# Patient Record
Sex: MALE | Race: ASIAN | NOT HISPANIC OR LATINO | Employment: FULL TIME | URBAN - NONMETROPOLITAN AREA
[De-identification: names, ages, dates, MRNs, and addresses within clinical notes are randomized per-mention and may not be internally consistent; named-entity substitution may affect disease eponyms.]

---

## 2019-02-19 ENCOUNTER — WALK IN (OUTPATIENT)
Dept: URGENT CARE | Age: 49
End: 2019-02-19

## 2019-02-19 VITALS
SYSTOLIC BLOOD PRESSURE: 131 MMHG | OXYGEN SATURATION: 98 % | HEART RATE: 102 BPM | TEMPERATURE: 98.4 F | WEIGHT: 180.78 LBS | RESPIRATION RATE: 18 BRPM | DIASTOLIC BLOOD PRESSURE: 91 MMHG

## 2019-02-19 DIAGNOSIS — H61.23 BILATERAL IMPACTED CERUMEN: ICD-10-CM

## 2019-02-19 DIAGNOSIS — J02.9 SORE THROAT: ICD-10-CM

## 2019-02-19 DIAGNOSIS — R52 BODY ACHES: ICD-10-CM

## 2019-02-19 DIAGNOSIS — H65.01 RIGHT ACUTE SEROUS OTITIS MEDIA, RECURRENCE NOT SPECIFIED: Primary | ICD-10-CM

## 2019-02-19 DIAGNOSIS — J06.9 ACUTE URI: ICD-10-CM

## 2019-02-19 DIAGNOSIS — J22 LOWER RESPIRATORY INFECTION: ICD-10-CM

## 2019-02-19 DIAGNOSIS — R68.83 CHILLS: ICD-10-CM

## 2019-02-19 LAB
FLUAV AG SPEC QL IF: NEGATIVE
FLUBV AG SPEC QL IF: NEGATIVE
S PYO AG THROAT QL: NEGATIVE
SPECIMEN SOURCE: NORMAL

## 2019-02-19 PROCEDURE — 87081 CULTURE SCREEN ONLY: CPT | Performed by: INTERNAL MEDICINE

## 2019-02-19 PROCEDURE — 87880 STREP A ASSAY W/OPTIC: CPT | Performed by: FAMILY MEDICINE

## 2019-02-19 PROCEDURE — 87804 INFLUENZA ASSAY W/OPTIC: CPT | Performed by: INTERNAL MEDICINE

## 2019-02-19 PROCEDURE — 99203 OFFICE O/P NEW LOW 30 MIN: CPT | Performed by: FAMILY MEDICINE

## 2019-02-19 RX ORDER — AZITHROMYCIN 250 MG/1
TABLET, FILM COATED ORAL
Qty: 6 TABLET | Refills: 0 | Status: SHIPPED | OUTPATIENT
Start: 2019-02-19

## 2019-02-19 RX ORDER — KETOCONAZOLE 20 MG/G
1 CREAM TOPICAL 2 TIMES DAILY
COMMUNITY
Start: 2019-02-18

## 2019-02-21 ENCOUNTER — TELEPHONE (OUTPATIENT)
Dept: URGENT CARE | Age: 49
End: 2019-02-21

## 2019-02-21 LAB
REPORT STATUS (RPT): NORMAL
S PYO SPEC QL CULT: NORMAL
SPECIMEN SOURCE: NORMAL

## 2023-02-06 ENCOUNTER — OFFICE VISIT (OUTPATIENT)
Dept: FAMILY MEDICINE CLINIC | Facility: CLINIC | Age: 53
End: 2023-02-06
Payer: COMMERCIAL

## 2023-02-06 VITALS
RESPIRATION RATE: 24 BRPM | SYSTOLIC BLOOD PRESSURE: 131 MMHG | DIASTOLIC BLOOD PRESSURE: 86 MMHG | BODY MASS INDEX: 30.04 KG/M2 | OXYGEN SATURATION: 98 % | HEIGHT: 67 IN | HEART RATE: 77 BPM | TEMPERATURE: 99 F | WEIGHT: 191.38 LBS

## 2023-02-06 DIAGNOSIS — J02.9 SORE THROAT: ICD-10-CM

## 2023-02-06 DIAGNOSIS — J02.9 PHARYNGITIS, UNSPECIFIED ETIOLOGY: Primary | ICD-10-CM

## 2023-02-06 LAB
CONTROL LINE PRESENT WITH A CLEAR BACKGROUND (YES/NO): YES YES/NO
KIT EXPIRATION DATE: NORMAL DATE
STREP GRP A CUL-SCR: NEGATIVE

## 2023-02-06 PROCEDURE — 3079F DIAST BP 80-89 MM HG: CPT | Performed by: PHYSICIAN ASSISTANT

## 2023-02-06 PROCEDURE — 3075F SYST BP GE 130 - 139MM HG: CPT | Performed by: PHYSICIAN ASSISTANT

## 2023-02-06 PROCEDURE — 87081 CULTURE SCREEN ONLY: CPT | Performed by: PHYSICIAN ASSISTANT

## 2023-02-06 PROCEDURE — 99202 OFFICE O/P NEW SF 15 MIN: CPT | Performed by: PHYSICIAN ASSISTANT

## 2023-02-06 PROCEDURE — 3008F BODY MASS INDEX DOCD: CPT | Performed by: PHYSICIAN ASSISTANT

## 2023-02-06 PROCEDURE — 87880 STREP A ASSAY W/OPTIC: CPT | Performed by: PHYSICIAN ASSISTANT

## 2023-02-06 RX ORDER — AZITHROMYCIN 250 MG/1
TABLET, FILM COATED ORAL
Qty: 6 TABLET | Refills: 0 | Status: SHIPPED | OUTPATIENT
Start: 2023-02-06 | End: 2023-02-11

## 2024-04-05 ENCOUNTER — WALK IN (OUTPATIENT)
Dept: URGENT CARE | Age: 54
End: 2024-04-05

## 2024-04-05 VITALS
OXYGEN SATURATION: 99 % | RESPIRATION RATE: 18 BRPM | HEART RATE: 112 BPM | TEMPERATURE: 98.8 F | SYSTOLIC BLOOD PRESSURE: 126 MMHG | DIASTOLIC BLOOD PRESSURE: 74 MMHG

## 2024-04-05 DIAGNOSIS — Z20.822 SUSPECTED COVID-19 VIRUS INFECTION: Primary | ICD-10-CM

## 2024-04-05 DIAGNOSIS — U07.1 COVID-19 VIRUS INFECTION: ICD-10-CM

## 2024-04-05 LAB
FLUAV AG UPPER RESP QL IA.RAPID: NEGATIVE
FLUBV AG UPPER RESP QL IA.RAPID: NEGATIVE
SARS-COV+SARS-COV-2 AG RESP QL IA.RAPID: DETECTED
TEST LOT EXPIRATION DATE: ABNORMAL
TEST LOT NUMBER: ABNORMAL

## 2024-04-05 ASSESSMENT — ENCOUNTER SYMPTOMS: COUGH: 1

## 2024-04-06 ENCOUNTER — TELEPHONE (OUTPATIENT)
Dept: URGENT CARE | Age: 54
End: 2024-04-06

## 2024-06-03 ENCOUNTER — IMAGING SERVICES (OUTPATIENT)
Dept: GENERAL RADIOLOGY | Age: 54
End: 2024-06-03
Attending: FAMILY MEDICINE

## 2024-06-03 ENCOUNTER — OFFICE VISIT (OUTPATIENT)
Dept: SPORTS MEDICINE | Age: 54
End: 2024-06-03

## 2024-06-03 VITALS
HEART RATE: 80 BPM | BODY MASS INDEX: 29.82 KG/M2 | WEIGHT: 190 LBS | HEIGHT: 67 IN | DIASTOLIC BLOOD PRESSURE: 85 MMHG | SYSTOLIC BLOOD PRESSURE: 122 MMHG

## 2024-06-03 DIAGNOSIS — E78.5 HYPERLIPIDEMIA LDL GOAL <130: ICD-10-CM

## 2024-06-03 DIAGNOSIS — M79.661 RIGHT CALF PAIN: ICD-10-CM

## 2024-06-03 DIAGNOSIS — S86.111A RUPTURE OF RIGHT GASTROCNEMIUS TENDON, INITIAL ENCOUNTER: ICD-10-CM

## 2024-06-03 DIAGNOSIS — R73.03 PREDIABETES: ICD-10-CM

## 2024-06-03 DIAGNOSIS — M79.661 RIGHT CALF PAIN: Primary | ICD-10-CM

## 2024-06-03 PROBLEM — E53.8 B12 DEFICIENCY: Status: ACTIVE | Noted: 2021-07-19

## 2024-06-03 PROBLEM — E55.9 VITAMIN D DEFICIENCY: Status: ACTIVE | Noted: 2021-07-19

## 2024-06-03 PROBLEM — M10.072 ACUTE IDIOPATHIC GOUT INVOLVING TOE OF LEFT FOOT: Status: ACTIVE | Noted: 2019-04-13

## 2024-06-03 PROCEDURE — 99204 OFFICE O/P NEW MOD 45 MIN: CPT | Performed by: FAMILY MEDICINE

## 2024-06-03 PROCEDURE — 73590 X-RAY EXAM OF LOWER LEG: CPT | Performed by: RADIOLOGY

## 2024-06-03 RX ORDER — TRIAMCINOLONE ACETONIDE 1 MG/G
CREAM TOPICAL
COMMUNITY
Start: 2023-12-19

## 2024-06-03 RX ORDER — ERGOCALCIFEROL 1.25 MG/1
1.25 CAPSULE ORAL
COMMUNITY

## 2024-06-03 RX ORDER — ATORVASTATIN CALCIUM 10 MG/1
10 TABLET, FILM COATED ORAL DAILY
COMMUNITY
Start: 2024-05-03

## 2024-06-03 RX ORDER — LANOLIN ALCOHOL/MO/W.PET/CERES
1000 CREAM (GRAM) TOPICAL
COMMUNITY
Start: 2024-05-03

## 2024-06-03 RX ORDER — MELOXICAM 15 MG/1
15 TABLET ORAL
Qty: 10 TABLET | Refills: 0 | Status: SHIPPED | OUTPATIENT
Start: 2024-06-03 | End: 2024-06-13

## 2024-06-03 RX ORDER — FLUTICASONE PROPIONATE 50 MCG
2 SPRAY, SUSPENSION (ML) NASAL PRN
COMMUNITY
Start: 2024-03-26

## 2024-06-03 RX ORDER — ALLOPURINOL 100 MG/1
100 TABLET ORAL
COMMUNITY
Start: 2024-05-03

## 2024-06-03 RX ORDER — FEXOFENADINE HCL 180 MG/1
180 TABLET ORAL PRN
COMMUNITY
Start: 2024-03-26 | End: 2024-10-22

## 2024-06-04 ENCOUNTER — TELEPHONE (OUTPATIENT)
Dept: SPORTS MEDICINE | Age: 54
End: 2024-06-04

## 2024-06-04 ENCOUNTER — OFFICE VISIT (OUTPATIENT)
Dept: PHYSICAL THERAPY | Age: 54
End: 2024-06-04

## 2024-06-04 DIAGNOSIS — M79.661 RIGHT CALF PAIN: Primary | ICD-10-CM

## 2024-06-04 PROCEDURE — 97161 PT EVAL LOW COMPLEX 20 MIN: CPT | Performed by: PHYSICAL THERAPIST

## 2024-06-04 PROCEDURE — 97112 NEUROMUSCULAR REEDUCATION: CPT | Performed by: PHYSICAL THERAPIST

## 2024-06-04 PROCEDURE — 97110 THERAPEUTIC EXERCISES: CPT | Performed by: PHYSICAL THERAPIST

## 2024-06-04 ASSESSMENT — ENCOUNTER SYMPTOMS
PAIN SCALE AT LOWEST: 0
ALLEVIATING FACTORS: AVOIDING MOVEMENT IN INVOLVED AREA
PAIN LOCATION: SEE NARRATIVE FOR MORE DETAILS
PAIN DESCRIPTION: SEE NARRATIVE FOR MORE DETAILS
SUBJECTIVE PAIN PROGRESSION: IMPROVED
PAIN SEVERITY NOW: 8
ALLEVIATING FACTOR: SEE NARRATIVE FOR MORE DETAILS
QUALITY: STIFF
PAIN SCALE AT HIGHEST: 9

## 2024-06-04 ASSESSMENT — MOVEMENT AND STRENGTH ASSESSMENTS
LIFTING AN OBJECT, LIKE A BAG OF GROCERIES, FROM THE FLOOR: QUITE A BIT OF DIFFICULTY
WALKING A MILE: EXTREME DIFFICULTY OR UNABLE TO PERFORM ACTIVITY
RUNNING ON EVEN GROUND: EXTREME DIFFICULTY OR UNABLE TO PERFORM ACTIVITY
STANDING FOR 1 HOUR: EXTREME DIFFICULTY OR UNABLE TO PERFORM ACTIVITY
PERFORMING LIGHT ACTIVITES AROUND YOUR HOME: QUITE A BIT OF DIFFICULTY
WALKING 2 BLOCKS: EXTREME DIFFICULTY OR UNABLE TO PERFORM ACTIVITY
SQUATTING: QUITE A BIT OF DIFFICULTY
HOPPING: EXTREME DIFFICULTY OR UNABLE TO PERFORM ACTIVITY
PUTTING ON YOUR SHOES OR SOCKS: QUITE A BIT OF DIFFICULTY
YOUR USUAL HOBBIES, RECREATIONAL OR SPORTING ACTIVIITIES: EXTREME DIFFICULTY OR UNABLE TO PERFORM ACTIVITY
WALKING BETWEEN ROOMS: QUITE A BIT OF DIFFICULTY
SITTING FOR 1 HOUR: A LITTLE BIT OF DIFFICULTY
MAKING SHARP TURNS WHILE RUNNING FAST: EXTREME DIFFICULTY OR UNABLE TO PERFORM ACTIVITY
ROLLING OVER IN BED: QUITE A BIT OF DIFFICULTY
ANY OF YOUR USUAL WORK, HOUSEWORK OR SCHOOL ACTIVITIES: EXTREME DIFFICULTY OR UNABLE TO PERFORM ACTIVITY
RUNNING ON UNEVEN GROUND: EXTREME DIFFICULTY OR UNABLE TO PERFORM ACTIVITY
GETTING INTO OR OUT OF A CAR: QUITE A BIT OF DIFFICULTY
GETTING INTO OR OUT OF THE BATH: QUITE A BIT OF DIFFICULTY
PERFORMING HEAVY ACTIVITIES AROUND YOUR HOME: EXTREME DIFFICULTY OR UNABLE TO PERFORM ACTIVITY
TOTAL SCORE: 15
GOING UP OR DOWN 10 STAIRS (ABOUT 1 FLIGHT OF STAIRS): QUITE A BIT OF DIFFICULTY

## 2024-06-06 ENCOUNTER — APPOINTMENT (OUTPATIENT)
Dept: PHYSICAL THERAPY | Age: 54
End: 2024-06-06

## 2024-06-11 ENCOUNTER — MED INFO FORMS (OUTPATIENT)
Dept: SPORTS MEDICINE | Age: 54
End: 2024-06-11

## 2024-06-11 ENCOUNTER — APPOINTMENT (OUTPATIENT)
Dept: PHYSICAL THERAPY | Age: 54
End: 2024-06-11

## 2024-06-11 DIAGNOSIS — M79.661 RIGHT CALF PAIN: Primary | ICD-10-CM

## 2024-06-11 PROCEDURE — 97110 THERAPEUTIC EXERCISES: CPT | Performed by: PHYSICAL THERAPIST

## 2024-06-11 PROCEDURE — 97530 THERAPEUTIC ACTIVITIES: CPT | Performed by: PHYSICAL THERAPIST

## 2024-06-11 PROCEDURE — 97112 NEUROMUSCULAR REEDUCATION: CPT | Performed by: PHYSICAL THERAPIST

## 2024-06-13 ENCOUNTER — APPOINTMENT (OUTPATIENT)
Dept: PHYSICAL THERAPY | Age: 54
End: 2024-06-13

## 2024-06-13 ENCOUNTER — E-ADVICE (OUTPATIENT)
Dept: PHYSICAL THERAPY | Age: 54
End: 2024-06-13

## 2024-06-13 DIAGNOSIS — M79.661 RIGHT CALF PAIN: Primary | ICD-10-CM

## 2024-06-18 ENCOUNTER — APPOINTMENT (OUTPATIENT)
Dept: PHYSICAL THERAPY | Age: 54
End: 2024-06-18

## 2024-06-18 ENCOUNTER — TELEPHONE (OUTPATIENT)
Dept: PHYSICAL THERAPY | Age: 54
End: 2024-06-18

## 2024-08-02 ENCOUNTER — APPOINTMENT (OUTPATIENT)
Dept: SPORTS MEDICINE | Age: 54
End: 2024-08-02

## 2024-10-10 ENCOUNTER — ORDER TRANSCRIPTION (OUTPATIENT)
Dept: ADMINISTRATIVE | Facility: HOSPITAL | Age: 54
End: 2024-10-10

## 2024-10-10 DIAGNOSIS — Z13.6 SCREENING FOR CARDIOVASCULAR CONDITION: Primary | ICD-10-CM

## 2024-10-12 ENCOUNTER — HOSPITAL ENCOUNTER (OUTPATIENT)
Dept: CT IMAGING | Facility: HOSPITAL | Age: 54
Discharge: HOME | End: 2024-10-12
Attending: SPECIALIST

## 2024-10-12 DIAGNOSIS — Z13.6 SCREENING FOR CARDIOVASCULAR CONDITION: ICD-10-CM

## 2024-10-12 LAB
HDL POC: 42 MG/DL (ref 45–60)
LDL POC: 164 MG/DL (ref 0–99)
TC/HDL RATIO: 5 (ref 0–5)
TOTAL CHOLESTEROL POC: 238 MG/DL (ref 0–200)
TRIGLYCERIDES POC: 157 MG/DL (ref 0–150)

## 2024-10-12 NOTE — PROGRESS NOTES
Date of Service 10/12/2024    SIDRA SIMON  Date of Birth 3/23/1970    Patient Age: 54 year old    PCP: Alejandro Okeefe MD  [address] [facility] [id number]      Heart Scan Consult  Preliminary Heart Scan Score: 286    Previous Screening  Heart Scan Completed Previously: No        Peripheral Vascular Scan Completed Previously: No          Risk Factors  Personal Risk Factors  Non-alterable Risk Factors: Family History (Father  young from cardiac arrest.  Mother had bypasses in her 60's.)  Alterable Risk Factors: Abnormal Cholesterol;Lack of exercise;Pre-Diabetes      Body Mass Index  BMI 29 (over weight)  waist measurement approximately 33 in ( this is good, want this less than 37 in)    Blood Pressure  /85  (Normal =< 120/80,  Elevated = 120-129/ >80,  High Stage1 130-139/80-89 , Stage2 >140/>90)    Lipid Profile  Patient was in fasting state: Yes    Cholesterol: 238, done on 10/12/2024.  HDL Cholesterol: 42, done on 10/12/2024.  LDL Cholesterol: 164, done on 10/12/2024.  TriGlycerides 157, done on 10/12/2024.  He was given a statin to start a few months ago, however, he has not started it yet.  He is concerned because he has read that dementia can be one of the side effects.  Encouraged him to discuss this with his doctor.     Cholesterol Goals  Value   Total  =< 200   HDL  = > 45 Men = > 55 Women   LDL   =< 100   Triglycerides  =< 150       Glucose and Hemoglobin A1C    Today fasting glucose was 114.  A1c from 3/2024 was 5.9 - Prediabetes.  Lab Results   Component Value Date    GLU  10/12/2024      Comment:      unable to report due to glucose control not within normal range. New controls are ordered.     (Normal Fasting Glucose < 100mg/dl )    Nurse Review  Risk factor information and results reviewed with Nurse: Yes    Recommended Follow Up:  Consult your physician regarding:: Final Heart Scan Report;Discuss potential for Incidental Finding      Recommendations for Change:  Nutrition Changes: Low  Saturated Fat;Increase Fiber    Cholesterol Modification (goal of therapy depends upon your risk): Decrease LDL (Lousy/Bad) Ideal <100;Increase HDL (Healthy/Good) Normal >45 Men >55 Women;Decrease Triglycerides (Ugly) Normal <150    Exercise: Enhance Current Program    Smoking Cessation: No Change Needed    Weight Management: Decrease Current Weight    Stress Management: Adopt Stress Management Techniques    Repeat Heart Scan: 3 Years if Calcium Score is > 0.0;Discuss with your Physician              [place] Recommended Resources:  Recommended Resources: PV Screening;Upcoming Classes, Medical Services and Health Library [email or web address]  Recommended PV Screening: Abdomen;Carotids         Jessica NOLAN, RN        Please Contact the Nurse Heart Line with any Questions or Concerns [phone].

## 2025-07-01 ENCOUNTER — APPOINTMENT (OUTPATIENT)
Dept: GASTROENTEROLOGY | Age: 55
End: 2025-07-01

## 2025-09-01 ENCOUNTER — WALK IN (OUTPATIENT)
Dept: URGENT CARE | Age: 55
End: 2025-09-01

## 2025-09-01 VITALS
RESPIRATION RATE: 18 BRPM | TEMPERATURE: 96.5 F | DIASTOLIC BLOOD PRESSURE: 74 MMHG | SYSTOLIC BLOOD PRESSURE: 130 MMHG | OXYGEN SATURATION: 100 % | HEART RATE: 96 BPM

## 2025-09-01 DIAGNOSIS — M10.9 ACUTE GOUT OF LEFT FOOT, UNSPECIFIED CAUSE: Primary | ICD-10-CM

## 2025-09-01 PROCEDURE — 99213 OFFICE O/P EST LOW 20 MIN: CPT | Performed by: FAMILY MEDICINE

## 2025-09-01 RX ORDER — INDOMETHACIN 50 MG/1
50 CAPSULE ORAL
Qty: 15 CAPSULE | Refills: 0 | Status: SHIPPED | OUTPATIENT
Start: 2025-09-01 | End: 2025-09-06

## 2025-09-01 RX ORDER — PREDNISONE 20 MG/1
40 TABLET ORAL DAILY
Qty: 10 TABLET | Refills: 0 | Status: SHIPPED | OUTPATIENT
Start: 2025-09-01 | End: 2025-09-06

## (undated) NOTE — LETTER
Date: 2/6/2023    Patient Name: Ventura Haq          To Whom it may concern: This letter has been written at the patient's request. [facility] [place] [place] [facility] for [facility] of a medical condition. This patient should be excused from work today 2/6/23.       Sincerely,      Wanda Velasquez PA-C